# Patient Record
Sex: FEMALE | Race: WHITE | NOT HISPANIC OR LATINO | URBAN - METROPOLITAN AREA
[De-identification: names, ages, dates, MRNs, and addresses within clinical notes are randomized per-mention and may not be internally consistent; named-entity substitution may affect disease eponyms.]

---

## 2024-07-02 ENCOUNTER — APPOINTMENT (RX ONLY)
Dept: URBAN - METROPOLITAN AREA CLINIC 170 | Facility: CLINIC | Age: 19
Setting detail: DERMATOLOGY
End: 2024-07-02

## 2024-07-02 DIAGNOSIS — Z41.9 ENCOUNTER FOR PROCEDURE FOR PURPOSES OTHER THAN REMEDYING HEALTH STATE, UNSPECIFIED: ICD-10-CM

## 2024-07-02 PROCEDURE — ? SIGNATURE HYDRAFACIAL

## 2024-07-02 PROCEDURE — ? COSMETIC CONSULTATION: GENERAL

## 2024-07-02 PROCEDURE — ? COSMETIC CONSULTATION: HYDRAFACIAL

## 2024-07-02 NOTE — PROCEDURE: SIGNATURE HYDRAFACIAL
Solution Override
Solution: Beta-HD
Procedure: Exfoliation
Vacuum Pressure High Setting (Will Not Render If Set To 0): 22
Procedure: Fusion
Location: face
Tip: Hydropeel Tip, Blue
Vacuum Pressure High Setting (Will Not Render If Set To 0): 0
Tip: Hydropeel Tip, Clear
Solution: GlySal 7.5%
Solution: Activ-4
Price (Use Numbers Only, No Special Characters Or $): 199
Tip: Hydropeel Tip, Teal
Vacuum Pressure Low Setting (Will Not Render If Set To 0): 28
Tip Override
Procedure: Extraction
Procedure: Extend and Protect
Treatment Number: 1
Indication: acne
Consent: Written consent obtained, risks reviewed including but not limited to crusting, scabbing, blistering, scarring, darker or lighter pigmentary change, bruising, and/or incomplete response.
Procedure: Peel
Post-Care Instructions: I reviewed with the patient in detail post-care instructions. Patient should stay away from the sun and wear sun protection until treated areas are fully healed.

## 2024-07-02 NOTE — HPI: COSMETIC CONSULTATION
When Outside In The Sun, Do You...: always burns, never tans
Additional History: Pt here with mom. Pt has large pimples, cysts , whiteheads and blackheads through out face.\\n